# Patient Record
Sex: FEMALE
[De-identification: names, ages, dates, MRNs, and addresses within clinical notes are randomized per-mention and may not be internally consistent; named-entity substitution may affect disease eponyms.]

---

## 2018-04-03 PROBLEM — Z00.00 ENCOUNTER FOR PREVENTIVE HEALTH EXAMINATION: Status: ACTIVE | Noted: 2018-04-03

## 2018-04-11 ENCOUNTER — APPOINTMENT (OUTPATIENT)
Dept: PULMONOLOGY | Facility: CLINIC | Age: 23
End: 2018-04-11
Payer: COMMERCIAL

## 2018-04-11 PROCEDURE — 93000 ELECTROCARDIOGRAM COMPLETE: CPT

## 2018-04-11 PROCEDURE — 36415 COLL VENOUS BLD VENIPUNCTURE: CPT

## 2018-04-11 PROCEDURE — 99203 OFFICE O/P NEW LOW 30 MIN: CPT | Mod: 25

## 2018-04-13 VITALS
OXYGEN SATURATION: 100 % | HEIGHT: 65 IN | SYSTOLIC BLOOD PRESSURE: 112 MMHG | RESPIRATION RATE: 16 BRPM | DIASTOLIC BLOOD PRESSURE: 67 MMHG | WEIGHT: 104.5 LBS | TEMPERATURE: 98 F | HEART RATE: 87 BPM

## 2018-04-16 ENCOUNTER — RESULT REVIEW (OUTPATIENT)
Age: 23
End: 2018-04-16

## 2018-04-16 ENCOUNTER — INPATIENT (INPATIENT)
Facility: HOSPITAL | Age: 23
LOS: 0 days | Discharge: HOME | DRG: 744 | End: 2018-04-17
Attending: OBSTETRICS & GYNECOLOGY | Admitting: OBSTETRICS & GYNECOLOGY
Payer: COMMERCIAL

## 2018-04-16 DIAGNOSIS — Z41.9 ENCOUNTER FOR PROCEDURE FOR PURPOSES OTHER THAN REMEDYING HEALTH STATE, UNSPECIFIED: Chronic | ICD-10-CM

## 2018-04-16 RX ORDER — SODIUM CHLORIDE 9 MG/ML
1000 INJECTION, SOLUTION INTRAVENOUS
Qty: 0 | Refills: 0 | Status: DISCONTINUED | OUTPATIENT
Start: 2018-04-16 | End: 2018-04-17

## 2018-04-16 RX ORDER — ENOXAPARIN SODIUM 100 MG/ML
30 INJECTION SUBCUTANEOUS EVERY 12 HOURS
Qty: 0 | Refills: 0 | Status: DISCONTINUED | OUTPATIENT
Start: 2018-04-16 | End: 2018-04-16

## 2018-04-16 RX ORDER — CYCLOSPORINE 100 MG/1
50 CAPSULE ORAL EVERY 12 HOURS
Qty: 0 | Refills: 0 | Status: DISCONTINUED | OUTPATIENT
Start: 2018-04-16 | End: 2018-04-17

## 2018-04-16 RX ORDER — METOCLOPRAMIDE HCL 10 MG
10 TABLET ORAL EVERY 6 HOURS
Qty: 0 | Refills: 0 | Status: DISCONTINUED | OUTPATIENT
Start: 2018-04-16 | End: 2018-04-17

## 2018-04-16 RX ORDER — ONDANSETRON 8 MG/1
4 TABLET, FILM COATED ORAL EVERY 6 HOURS
Qty: 0 | Refills: 0 | Status: DISCONTINUED | OUTPATIENT
Start: 2018-04-16 | End: 2018-04-17

## 2018-04-16 RX ORDER — SIMETHICONE 80 MG/1
80 TABLET, CHEWABLE ORAL EVERY 8 HOURS
Qty: 0 | Refills: 0 | Status: DISCONTINUED | OUTPATIENT
Start: 2018-04-16 | End: 2018-04-17

## 2018-04-16 RX ORDER — CYCLOSPORINE 100 MG/1
50 CAPSULE ORAL EVERY 12 HOURS
Qty: 0 | Refills: 0 | Status: DISCONTINUED | OUTPATIENT
Start: 2018-04-16 | End: 2018-04-16

## 2018-04-16 RX ORDER — ENOXAPARIN SODIUM 100 MG/ML
30 INJECTION SUBCUTANEOUS
Qty: 0 | Refills: 0 | COMMUNITY
Start: 2018-04-16 | End: 2018-04-21

## 2018-04-16 RX ORDER — SODIUM CHLORIDE 9 MG/ML
1000 INJECTION, SOLUTION INTRAVENOUS
Qty: 0 | Refills: 0 | Status: DISCONTINUED | OUTPATIENT
Start: 2018-04-16 | End: 2018-04-16

## 2018-04-16 RX ORDER — MORPHINE SULFATE 50 MG/1
2 CAPSULE, EXTENDED RELEASE ORAL
Qty: 0 | Refills: 0 | Status: DISCONTINUED | OUTPATIENT
Start: 2018-04-16 | End: 2018-04-17

## 2018-04-16 RX ORDER — ENOXAPARIN SODIUM 100 MG/ML
30 INJECTION SUBCUTANEOUS EVERY 12 HOURS
Qty: 0 | Refills: 0 | Status: DISCONTINUED | OUTPATIENT
Start: 2018-04-16 | End: 2018-04-17

## 2018-04-16 RX ADMIN — Medication 10 MILLIGRAM(S): at 11:40

## 2018-04-16 RX ADMIN — MORPHINE SULFATE 2 MILLIGRAM(S): 50 CAPSULE, EXTENDED RELEASE ORAL at 13:02

## 2018-04-16 RX ADMIN — ONDANSETRON 4 MILLIGRAM(S): 8 TABLET, FILM COATED ORAL at 11:26

## 2018-04-16 RX ADMIN — CYCLOSPORINE 50 MILLIGRAM(S): 100 CAPSULE ORAL at 22:14

## 2018-04-16 RX ADMIN — MORPHINE SULFATE 2 MILLIGRAM(S): 50 CAPSULE, EXTENDED RELEASE ORAL at 11:58

## 2018-04-16 RX ADMIN — ENOXAPARIN SODIUM 30 MILLIGRAM(S): 100 INJECTION SUBCUTANEOUS at 17:16

## 2018-04-16 RX ADMIN — SIMETHICONE 80 MILLIGRAM(S): 80 TABLET, CHEWABLE ORAL at 21:05

## 2018-04-16 RX ADMIN — SODIUM CHLORIDE 125 MILLILITER(S): 9 INJECTION, SOLUTION INTRAVENOUS at 11:02

## 2018-04-16 NOTE — BRIEF OPERATIVE NOTE - PROCEDURE
<<-----Click on this checkbox to enter Procedure Hysteroscopy  04/16/2018    Active  YDANOVITC1  Appendectomy  04/16/2018    Active  YDANOVITC1  Excision of endometriosis  04/16/2018    Active  YDANOVITC1

## 2018-04-16 NOTE — BRIEF OPERATIVE NOTE - OPERATION/FINDINGS
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

## 2018-04-16 NOTE — PROGRESS NOTE ADULT - SUBJECTIVE AND OBJECTIVE BOX
Patient seen at bedside for post op check. Pain is well controlled. Patient has a gorman and SCDs. Has not yet passed flatus or ambulated. Tolerating regular diet. Denies headache, dizziness, nausea/vomiting, shortness of breath, palpitations, heavy bleeding.     T(C): 36 (04-16-18 @ 16:16), Max: 36.8 (04-16-18 @ 13:05)  HR: 84 (04-16-18 @ 16:16) (67 - 92)  BP: 95/58 (04-16-18 @ 16:16) (90/55 - 115/57)  RR: 15 (04-16-18 @ 16:16) (14 - 18)  SpO2: 100% (04-16-18 @ 16:16) (100% - 100%)    Gen: NAD, AO x3  Chest: normal work of breathing  Abdomen: soft, nontender, nondistended, no rebound or guarding  Incision: dressing clean, dry, intact  : gorman draining clear urine  Extremities: SCDs on      04-16-18 @ 07:01  -  04-16-18 @ 17:36  --------------------------------------------------------  IN: 500 mL / OUT: 85 mL / NET: 415 mL

## 2018-04-16 NOTE — PROGRESS NOTE ADULT - ASSESSMENT
23y Female POD#0 s/p L/s endo excision, hysteroscopy, appendectomy, doing well.     1. Neuro/Pain:  OPM  2  CV:   VS per routine  3. Pulm: Encourage ISS  4. GI: Reg diet  5. :  Benton in place, TOV in AM  6. Heme: AM CBC  7. ID: --  8. DVT ppx: SCDs  9. Dispo: Likely POD#1

## 2018-04-16 NOTE — H&P ADULT - HISTORY OF PRESENT ILLNESS
22yo P0 presents for laparoscopy for endometriosis. She has had pain for 2 years with ovulation and her period. LMP 3/26. No pain with bowel movements or urination or deep penetration; no pain down the legs. Has not tried medication other than tylenol. She has a medical history significant for aplastic anemia diagnosed in 2010 when she went for a routine physical exam and was found on CBC. She did ATG treatment and has since been on cyclosporin 50mg BID. She cannot take many medications as they interact with this. She is also no magnesium chloride and folic acid. She can take extra strength tylenol. She has a PNH (paroxysmal nocturnal hemoglobinuria) clone and was told to take Lovenox 30mg BID x 5 days starting today. Per chart, also has asthma. No other significant medical history. No other surgeries other than a small left knee surgery. NKDA other than anything that interacts with cyclosporin.       Hb 11.2 24yo P0 presents for laparoscopy for endometriosis. She has had pain for 2 years with ovulation and her period. LMP 3/26. No pain with bowel movements or urination or deep penetration; no pain down the legs. Has not tried medication other than tylenol. She has a medical history significant for aplastic anemia diagnosed in 2010 when she went for a routine physical exam and was found on CBC. She did ATG treatment and has since been on cyclosporine 50mg BID. She cannot take many medications as they interact with this. She is also no magnesium chloride and folic acid. She can take extra strength tylenol. She has a PNH (paroxysmal nocturnal hemoglobinuria) clone and was told to take Lovenox 30mg BID x 5 days starting today. Per chart, also has asthma. No other significant medical history. No other surgeries other than a small left knee surgery. NKDA other than anything that interacts with cyclosporine.       Hb 11.2

## 2018-04-17 ENCOUNTER — TRANSCRIPTION ENCOUNTER (OUTPATIENT)
Age: 23
End: 2018-04-17

## 2018-04-17 VITALS
SYSTOLIC BLOOD PRESSURE: 97 MMHG | TEMPERATURE: 98 F | DIASTOLIC BLOOD PRESSURE: 54 MMHG | HEART RATE: 84 BPM | RESPIRATION RATE: 16 BRPM | OXYGEN SATURATION: 99 %

## 2018-04-17 LAB
BASOPHILS NFR BLD AUTO: 0 % — SIGNIFICANT CHANGE UP (ref 0–2)
EOSINOPHIL NFR BLD AUTO: 0 % — SIGNIFICANT CHANGE UP (ref 0–6)
HCT VFR BLD CALC: 27.7 % — LOW (ref 34.5–45)
HGB BLD-MCNC: 9.5 G/DL — LOW (ref 11.5–15.5)
LYMPHOCYTES # BLD AUTO: 22.7 % — SIGNIFICANT CHANGE UP (ref 13–44)
MCHC RBC-ENTMCNC: 33.9 PG — SIGNIFICANT CHANGE UP (ref 27–34)
MCHC RBC-ENTMCNC: 34.3 G/DL — SIGNIFICANT CHANGE UP (ref 32–36)
MCV RBC AUTO: 98.9 FL — SIGNIFICANT CHANGE UP (ref 80–100)
MONOCYTES NFR BLD AUTO: 10.4 % — SIGNIFICANT CHANGE UP (ref 2–14)
NEUTROPHILS NFR BLD AUTO: 66.9 % — SIGNIFICANT CHANGE UP (ref 43–77)
PLATELET # BLD AUTO: 86 K/UL — LOW (ref 150–400)
RBC # BLD: 2.8 M/UL — LOW (ref 3.8–5.2)
RBC # FLD: 14.1 % — SIGNIFICANT CHANGE UP (ref 10.3–16.9)
WBC # BLD: 4.4 K/UL — SIGNIFICANT CHANGE UP (ref 3.8–10.5)
WBC # FLD AUTO: 4.4 K/UL — SIGNIFICANT CHANGE UP (ref 3.8–10.5)

## 2018-04-17 RX ORDER — ACETAMINOPHEN 500 MG
650 TABLET ORAL EVERY 6 HOURS
Qty: 0 | Refills: 0 | Status: DISCONTINUED | OUTPATIENT
Start: 2018-04-17 | End: 2018-04-17

## 2018-04-17 RX ORDER — CYCLOSPORINE 100 MG/1
1 CAPSULE ORAL
Qty: 0 | Refills: 0 | COMMUNITY
Start: 2018-04-17

## 2018-04-17 RX ADMIN — ENOXAPARIN SODIUM 30 MILLIGRAM(S): 100 INJECTION SUBCUTANEOUS at 06:57

## 2018-04-17 RX ADMIN — SIMETHICONE 80 MILLIGRAM(S): 80 TABLET, CHEWABLE ORAL at 05:26

## 2018-04-17 RX ADMIN — CYCLOSPORINE 50 MILLIGRAM(S): 100 CAPSULE ORAL at 09:57

## 2018-04-17 NOTE — DISCHARGE NOTE ADULT - PATIENT PORTAL LINK FT
You can access the [facility], offered by [facility], by registering with the following website: [email or web address]

## 2018-04-17 NOTE — PROGRESS NOTE ADULT - SUBJECTIVE AND OBJECTIVE BOX
GYN Progress Note    Patient seen at bedside.  TARSHA overnight.  She reports that pain is well controlled and she looks forward to ambulating this morning.  Has been on bedrest with gorman in situ.  Tolerating clear fluids.  No flatus; denies nausea/vomiting.  Denies CP, palpitations, SOB, fever, chills.    Vital Signs Last 24 Hrs  T(C): 37.3 (17 Apr 2018 05:16), Max: 37.3 (16 Apr 2018 20:47)  T(F): 99.1 (17 Apr 2018 05:16), Max: 99.1 (16 Apr 2018 20:47)  HR: 88 (17 Apr 2018 05:16) (67 - 92)  BP: 96/52 (17 Apr 2018 05:16) (90/55 - 115/57)  BP(mean): 67 (16 Apr 2018 12:30) (67 - 76)  RR: 18 (17 Apr 2018 05:16) (14 - 18)  SpO2: 98% (17 Apr 2018 05:16) (98% - 100%)    Physical Exam:  Gen: No Acute Distress  Pulm: Clear to auscultation bilaterally  GI: soft, nontender, mildly distended, +BS, no rebound, no guarding.  Incisions with minimal staining of serosanguinous fluid, replaced; bilateral ovarian suspensions removed without difficulty  Ext: SCDs in place, wnl    I&O's Summary    16 Apr 2018 07:01  -  17 Apr 2018 06:42  --------------------------------------------------------  IN: 1625 mL / OUT: 1285 mL / NET: 340 mL      MEDICATIONS  (STANDING):  cycloSPORINE  , modified (NEORAL) 50 milliGRAM(s) Oral every 12 hours  enoxaparin Injectable 30 milliGRAM(s) SubCutaneous every 12 hours  lactated ringers. 1000 milliLiter(s) (125 mL/Hr) IV Continuous <Continuous>  simethicone 80 milliGRAM(s) Chew every 8 hours    MEDICATIONS  (PRN):  metoclopramide Injectable 10 milliGRAM(s) IV Push every 6 hours PRN nausea and/or vomiting not relieved by zofran  morphine  - Injectable 2 milliGRAM(s) IV Push every 15 minutes PRN Moderate Pain  ondansetron Injectable 4 milliGRAM(s) IV Push every 6 hours PRN Nausea and/or Vomiting      LABS:

## 2018-04-17 NOTE — DISCHARGE NOTE ADULT - CARE PLAN
Principal Discharge DX:	Endometriosis  Goal:	Resume normal activity  Assessment and plan of treatment:	No heavy lifting, nothing in the vagina for 2 weeks--no sex, tampons, or baths. Showers ok. Motrin or Tylenol as needed for pain. Follow up with Dr. Barnes in 1-2 weeks. Call to schedule your appointment. Regular diet.

## 2018-04-17 NOTE — PROGRESS NOTE ADULT - ASSESSMENT
23y Female POD#1 s/p L/s endo excision, hysteroscopy, appendectomy  - PNH with aplastic anemia - continue home Cyclosporine 50 BID, f/u AM CBC with Diff    1. Neuro/Pain:  OPM, no NSAIDS  2  CV:   VS per routine  3. Pulm: Encourage ISS  4. GI: Reg diet  5. :  DC Benton, f/u TOV  6. Heme: AM CBC pending  7. ID: --  8. DVT ppx: SCDs, Lovenox 30mg BID  9. Dispo: Likely POD#1

## 2018-04-17 NOTE — DISCHARGE NOTE ADULT - PLAN OF CARE
Resume normal activity No heavy lifting, nothing in the vagina for 2 weeks--no sex, tampons, or baths. Showers ok. Motrin or Tylenol as needed for pain. Follow up with Dr. Barnes in 1-2 weeks. Call to schedule your appointment. Regular diet.

## 2018-04-17 NOTE — DISCHARGE NOTE ADULT - MEDICATION SUMMARY - MEDICATIONS TO TAKE
I will START or STAY ON the medications listed below when I get home from the hospital:  None I will START or STAY ON the medications listed below when I get home from the hospital:    enoxaparin  -- 30 unit(s) subcutaneously 2 times a day  -- Indication: For Aplastic anemia    cycloSPORINE modified 50 mg oral capsule  -- 1 cap(s) by mouth every 12 hours  -- Indication: For Aplastic anemia

## 2018-04-17 NOTE — DISCHARGE NOTE ADULT - CARE PROVIDER_API CALL
Agustin Barnes), Obstetrics and Gynecology  [address] [place] [address]  Phone: [phone]  Fax: [phone]

## 2018-04-18 PROCEDURE — 86901 BLOOD TYPING SEROLOGIC RH(D): CPT

## 2018-04-18 PROCEDURE — 85025 COMPLETE CBC W/AUTO DIFF WBC: CPT

## 2018-04-18 PROCEDURE — 36415 COLL VENOUS BLD VENIPUNCTURE: CPT

## 2018-04-18 PROCEDURE — 86850 RBC ANTIBODY SCREEN: CPT

## 2018-04-18 PROCEDURE — 86900 BLOOD TYPING SEROLOGIC ABO: CPT

## 2018-04-18 PROCEDURE — 88304 TISSUE EXAM BY PATHOLOGIST: CPT

## 2018-04-18 PROCEDURE — 88305 TISSUE EXAM BY PATHOLOGIST: CPT

## 2018-04-20 LAB — SURGICAL PATHOLOGY STUDY: SIGNIFICANT CHANGE UP

## 2018-04-23 DIAGNOSIS — N80.0 ENDOMETRIOSIS OF UTERUS: ICD-10-CM

## 2018-04-23 DIAGNOSIS — Q51.810 ARCUATE UTERUS: ICD-10-CM

## 2018-04-23 DIAGNOSIS — N80.9 ENDOMETRIOSIS, UNSPECIFIED: ICD-10-CM

## 2018-04-23 DIAGNOSIS — N80.1 ENDOMETRIOSIS OF OVARY: ICD-10-CM

## 2018-04-23 DIAGNOSIS — D61.9 APLASTIC ANEMIA, UNSPECIFIED: ICD-10-CM

## 2018-04-23 DIAGNOSIS — N80.3 ENDOMETRIOSIS OF PELVIC PERITONEUM: ICD-10-CM
